# Patient Record
Sex: MALE | Race: WHITE
[De-identification: names, ages, dates, MRNs, and addresses within clinical notes are randomized per-mention and may not be internally consistent; named-entity substitution may affect disease eponyms.]

---

## 2023-01-30 PROBLEM — Z00.00 ENCOUNTER FOR PREVENTIVE HEALTH EXAMINATION: Status: ACTIVE | Noted: 2023-01-30

## 2023-03-01 ENCOUNTER — APPOINTMENT (OUTPATIENT)
Dept: ORTHOPEDIC SURGERY | Facility: CLINIC | Age: 53
End: 2023-03-01
Payer: COMMERCIAL

## 2023-03-01 VITALS — BODY MASS INDEX: 29.12 KG/M2 | HEIGHT: 72 IN | WEIGHT: 215 LBS

## 2023-03-01 DIAGNOSIS — Z78.9 OTHER SPECIFIED HEALTH STATUS: ICD-10-CM

## 2023-03-01 DIAGNOSIS — Z87.891 PERSONAL HISTORY OF NICOTINE DEPENDENCE: ICD-10-CM

## 2023-03-01 PROCEDURE — 99204 OFFICE O/P NEW MOD 45 MIN: CPT

## 2023-03-01 PROCEDURE — 72100 X-RAY EXAM L-S SPINE 2/3 VWS: CPT

## 2023-03-01 NOTE — HISTORY OF PRESENT ILLNESS
[Lower back] : lower back [Gradual] : gradual [8] : 8 [5] : 5 [Dull/Aching] : dull/aching [Sharp] : sharp [Intermittent] : intermittent [Nothing helps with pain getting better] : Nothing helps with pain getting better [Full time] : Work status: full time [de-identified] : Patient presents today with lower back pain for years with NKI. Previously went to a chiropractor. States his pain intermittently radiates down his right leg. Admits to being very stiff in the morning. Admits to taking Aleve PRN for pain. Denies recent imaging. [] : no [FreeTextEntry7] : down the right leg [de-identified] : certain movements [de-identified] :

## 2023-05-03 ENCOUNTER — APPOINTMENT (OUTPATIENT)
Dept: ORTHOPEDIC SURGERY | Facility: CLINIC | Age: 53
End: 2023-05-03
Payer: COMMERCIAL

## 2023-05-03 VITALS — WEIGHT: 215 LBS | HEIGHT: 72 IN | BODY MASS INDEX: 29.12 KG/M2

## 2023-05-03 PROCEDURE — 99214 OFFICE O/P EST MOD 30 MIN: CPT

## 2023-05-03 NOTE — ASSESSMENT
[FreeTextEntry1] : Patient given prescription for MRI, follow up after study is completed to discuss results. \par \par Patient has been doing a home exercise plan based on exercises given on their last office visit.  These exercises include calf stretching, hamstring stretching, hip flexor stretching, hip rotator stretch, quad stretching, curl ups, bridges, prone press up, knee lift leg reach and wall slide.  Patient has been doing these exercises for over 6 weeks, roughly 4 times a week for 30 minutes daily.  Patient is still experiencing low back pain with radiculopathy. \par \par Patient will continue physical therapy. \par \par Recommend: - NSAID - Heating pad - Muscle relaxer - Core strengthening exercise - Hamstring stretching exercise Patient is given back rehabilitation exercise book.

## 2023-05-03 NOTE — HISTORY OF PRESENT ILLNESS
[Lower back] : lower back [Gradual] : gradual [8] : 8 [5] : 5 [Dull/Aching] : dull/aching [Sharp] : sharp [Intermittent] : intermittent [Nothing helps with pain getting better] : Nothing helps with pain getting better [Full time] : Work status: full time [de-identified] : Follow up lumbar spine. States he feels improvement. Admits to going to PT 2x a week with some relief. Denies taking pain medication. [] : no [FreeTextEntry7] : down the right leg [de-identified] : certain movements [de-identified] :

## 2023-05-20 ENCOUNTER — RESULT REVIEW (OUTPATIENT)
Age: 53
End: 2023-05-20

## 2023-06-07 ENCOUNTER — NON-APPOINTMENT (OUTPATIENT)
Age: 53
End: 2023-06-07

## 2023-06-07 ENCOUNTER — APPOINTMENT (OUTPATIENT)
Dept: UROLOGY | Facility: CLINIC | Age: 53
End: 2023-06-07
Payer: COMMERCIAL

## 2023-06-07 VITALS
WEIGHT: 210 LBS | HEIGHT: 72 IN | TEMPERATURE: 97.3 F | HEART RATE: 76 BPM | DIASTOLIC BLOOD PRESSURE: 81 MMHG | BODY MASS INDEX: 28.44 KG/M2 | SYSTOLIC BLOOD PRESSURE: 146 MMHG

## 2023-06-07 DIAGNOSIS — Z86.79 PERSONAL HISTORY OF OTHER DISEASES OF THE CIRCULATORY SYSTEM: ICD-10-CM

## 2023-06-07 PROCEDURE — 99204 OFFICE O/P NEW MOD 45 MIN: CPT

## 2023-06-07 RX ORDER — AMLODIPINE BESYLATE 10 MG/1
10 TABLET ORAL
Refills: 0 | Status: ACTIVE | COMMUNITY

## 2023-06-07 NOTE — ASSESSMENT
[FreeTextEntry1] : Low testosterone:\par c/w 100mg cypionate weekly\par f/u 6 weeks with bloodwork\par \par LUTS:\par start tamsulosin\par side effects of drowsiness and retrograde ejaculation explained

## 2023-06-07 NOTE — HISTORY OF PRESENT ILLNESS
[FreeTextEntry1] : 53yo M referred for low testosterone\par Pt is self-injecting 100mg weekly. \par \par Last blood work showed testosterone 198 total on 5/5/2023\par Testosterone 114 total & 2.8 % free on 5/30/2023\par \par Pt also reports urinary frequency and nocturia.

## 2023-06-07 NOTE — LETTER BODY
[Dear  ___] : Dear ~MARLENY, [Consult Letter:] : I had the pleasure of evaluating your patient, [unfilled]. [Please see my note below.] : Please see my note below. [Consult Closing:] : Thank you very much for allowing me to participate in the care of this patient.  If you have any questions, please do not hesitate to contact me. [Sincerely,] : Sincerely, [FreeTextEntry3] : Aparna Sanderson, \par Genitourinary Medicine\par [facility]\par

## 2023-06-07 NOTE — PHYSICAL EXAM
[General Appearance - Well Developed] : well developed [General Appearance - Well Nourished] : well nourished [Normal Appearance] : normal appearance [Well Groomed] : well groomed [General Appearance - In No Acute Distress] : no acute distress [Edema] : no peripheral edema [Respiration, Rhythm And Depth] : normal respiratory rhythm and effort [] : no respiratory distress [Exaggerated Use Of Accessory Muscles For Inspiration] : no accessory muscle use [Urethral Meatus] : meatus normal [Testes Tenderness] : no tenderness of the testes [Normal Station and Gait] : the gait and station were normal for the patient's age [No Focal Deficits] : no focal deficits [Oriented To Time, Place, And Person] : oriented to person, place, and time [Affect] : the affect was normal [Mood] : the mood was normal [Not Anxious] : not anxious [FreeTextEntry1] : left atrophic testicle

## 2023-06-07 NOTE — REVIEW OF SYSTEMS
[Wake up at night to urinate  How many times?  ___] : wakes up to urinate [unfilled] times during the night [Negative] : Endocrine [see HPI] : see HPI [FreeTextEntry2] : weak stream

## 2023-06-23 ENCOUNTER — NON-APPOINTMENT (OUTPATIENT)
Age: 53
End: 2023-06-23

## 2023-06-27 ENCOUNTER — NON-APPOINTMENT (OUTPATIENT)
Age: 53
End: 2023-06-27

## 2023-07-03 ENCOUNTER — NON-APPOINTMENT (OUTPATIENT)
Age: 53
End: 2023-07-03

## 2023-07-18 ENCOUNTER — APPOINTMENT (OUTPATIENT)
Dept: UROLOGY | Facility: CLINIC | Age: 53
End: 2023-07-18
Payer: COMMERCIAL

## 2023-07-18 VITALS
WEIGHT: 205 LBS | SYSTOLIC BLOOD PRESSURE: 125 MMHG | HEART RATE: 61 BPM | HEIGHT: 72 IN | BODY MASS INDEX: 27.77 KG/M2 | TEMPERATURE: 97.3 F | DIASTOLIC BLOOD PRESSURE: 73 MMHG

## 2023-07-18 PROCEDURE — 99214 OFFICE O/P EST MOD 30 MIN: CPT

## 2023-07-18 NOTE — REVIEW OF SYSTEMS
[see HPI] : see HPI [Wake up at night to urinate  How many times?  ___] : wakes up to urinate [unfilled] times during the night [Negative] : Endocrine [FreeTextEntry2] : weak stream

## 2023-07-18 NOTE — ASSESSMENT
[FreeTextEntry1] : Low testosterone:\par refilled 100mg cypionate weekly\par f/u in 2 months with repeat blood work\par if testosterone and LFTs remain elevated, will lower testosterone\par \par LUTS:\par refilled tamsulosin\par

## 2023-07-18 NOTE — HISTORY OF PRESENT ILLNESS
[FreeTextEntry1] : 53yo M referred for low testosterone\par Pt is self-injecting 100mg weekly. \par \par Recent blood work showed high testosterone, elevated Cr and LFTs. \par He has appointment to see nephrologist in Sept. \par \par He started tamsulosin and reports his frequency and nocturia improved

## 2023-07-20 ENCOUNTER — APPOINTMENT (OUTPATIENT)
Dept: ORTHOPEDIC SURGERY | Facility: CLINIC | Age: 53
End: 2023-07-20

## 2023-09-19 ENCOUNTER — APPOINTMENT (OUTPATIENT)
Dept: UROLOGY | Facility: CLINIC | Age: 53
End: 2023-09-19
Payer: COMMERCIAL

## 2023-09-19 VITALS
DIASTOLIC BLOOD PRESSURE: 84 MMHG | HEART RATE: 69 BPM | TEMPERATURE: 97.3 F | SYSTOLIC BLOOD PRESSURE: 147 MMHG | HEIGHT: 72 IN | WEIGHT: 205 LBS | BODY MASS INDEX: 27.77 KG/M2

## 2023-09-19 PROCEDURE — 99214 OFFICE O/P EST MOD 30 MIN: CPT

## 2023-10-04 ENCOUNTER — APPOINTMENT (OUTPATIENT)
Dept: ORTHOPEDIC SURGERY | Facility: CLINIC | Age: 53
End: 2023-10-04
Payer: COMMERCIAL

## 2023-10-04 DIAGNOSIS — M51.26 OTHER INTERVERTEBRAL DISC DISPLACEMENT, LUMBAR REGION: ICD-10-CM

## 2023-10-04 DIAGNOSIS — M54.16 RADICULOPATHY, LUMBAR REGION: ICD-10-CM

## 2023-10-04 DIAGNOSIS — M48.061 SPINAL STENOSIS, LUMBAR REGION WITHOUT NEUROGENIC CLAUDICATION: ICD-10-CM

## 2023-10-04 DIAGNOSIS — M51.36 OTHER INTERVERTEBRAL DISC DEGENERATION, LUMBAR REGION: ICD-10-CM

## 2023-10-04 DIAGNOSIS — M51.37 OTHER INTERVERTEBRAL DISC DEGENERATION, LUMBOSACRAL REGION: ICD-10-CM

## 2023-10-04 DIAGNOSIS — M47.817 SPONDYLOSIS W/OUT MYELOPATHY OR RADICULOPATHY, LUMBOSACRAL REGION: ICD-10-CM

## 2023-10-04 PROCEDURE — 99214 OFFICE O/P EST MOD 30 MIN: CPT

## 2024-03-06 ENCOUNTER — APPOINTMENT (OUTPATIENT)
Dept: UROLOGY | Facility: CLINIC | Age: 54
End: 2024-03-06
Payer: COMMERCIAL

## 2024-03-06 VITALS
SYSTOLIC BLOOD PRESSURE: 136 MMHG | DIASTOLIC BLOOD PRESSURE: 78 MMHG | BODY MASS INDEX: 27.77 KG/M2 | TEMPERATURE: 96.5 F | HEIGHT: 72 IN | HEART RATE: 67 BPM | WEIGHT: 205 LBS

## 2024-03-06 PROCEDURE — 99214 OFFICE O/P EST MOD 30 MIN: CPT

## 2024-03-06 RX ORDER — TAMSULOSIN HYDROCHLORIDE 0.4 MG/1
0.4 CAPSULE ORAL
Qty: 90 | Refills: 1 | Status: ACTIVE | COMMUNITY
Start: 2023-06-07 | End: 1900-01-01

## 2024-03-06 NOTE — PHYSICAL EXAM
[General Appearance - Well Developed] : well developed [General Appearance - Well Nourished] : well nourished [Normal Appearance] : normal appearance [Well Groomed] : well groomed [General Appearance - In No Acute Distress] : no acute distress [] : no respiratory distress [Edema] : no peripheral edema [Exaggerated Use Of Accessory Muscles For Inspiration] : no accessory muscle use [Respiration, Rhythm And Depth] : normal respiratory rhythm and effort [Oriented To Time, Place, And Person] : oriented to person, place, and time [Affect] : the affect was normal [Not Anxious] : not anxious [Mood] : the mood was normal [No Focal Deficits] : no focal deficits [Normal Station and Gait] : the gait and station were normal for the patient's age

## 2024-03-07 NOTE — REVIEW OF SYSTEMS
[Wake up at night to urinate  How many times?  ___] : wakes up to urinate [unfilled] times during the night [see HPI] : see HPI [Negative] : Endocrine [FreeTextEntry2] : weak stream

## 2024-03-07 NOTE — HISTORY OF PRESENT ILLNESS
[FreeTextEntry1] : 52yo M presents for f/u Pt is self-injecting 200mg every 2 weeks  Recent BW showed low testosterone. He took his bloodwork 1 day prior to his injection.  He takes tamsulosin and reports his frequency and nocturia improved  PSA 0.26 on 8/2023

## 2024-03-07 NOTE — ASSESSMENT
[FreeTextEntry1] : Low testosterone: refilled 200mg cypionate every 2 weeks f/u 3 months with bloodwork  LUTS: refilled tamsulosin

## 2024-06-04 ENCOUNTER — APPOINTMENT (OUTPATIENT)
Dept: UROLOGY | Facility: CLINIC | Age: 54
End: 2024-06-04
Payer: COMMERCIAL

## 2024-06-04 VITALS
WEIGHT: 205 LBS | DIASTOLIC BLOOD PRESSURE: 72 MMHG | SYSTOLIC BLOOD PRESSURE: 152 MMHG | HEIGHT: 72 IN | BODY MASS INDEX: 27.77 KG/M2 | TEMPERATURE: 98 F | HEART RATE: 76 BPM

## 2024-06-04 DIAGNOSIS — R39.9 UNSPECIFIED SYMPTOMS AND SIGNS INVOLVING THE GENITOURINARY SYSTEM: ICD-10-CM

## 2024-06-04 DIAGNOSIS — R79.89 OTHER SPECIFIED ABNORMAL FINDINGS OF BLOOD CHEMISTRY: ICD-10-CM

## 2024-06-04 PROCEDURE — 99214 OFFICE O/P EST MOD 30 MIN: CPT

## 2024-06-04 RX ORDER — TESTOSTERONE CYPIONATE 200 MG/ML
200 INJECTION, SOLUTION INTRAMUSCULAR
Qty: 1 | Refills: 0 | Status: ACTIVE | COMMUNITY
Start: 2023-06-07 | End: 1900-01-01

## 2024-06-04 NOTE — HISTORY OF PRESENT ILLNESS
[FreeTextEntry1] : 54yo M presents for f/u Pt is self-injecting 200mg every 2 weeks  Recent BW showed high testosterone.   He takes tamsulosin and reports his frequency and nocturia improved  PSA 0.26 on 8/2023

## 2024-06-04 NOTE — ASSESSMENT
[FreeTextEntry1] : Low testosterone: will lower testosterone to 150mg every 2 weeks f/u 3 months with bloodwork  LUTS: c/w tamsulosin  show

## 2024-07-30 ENCOUNTER — APPOINTMENT (OUTPATIENT)
Dept: NEUROLOGY | Facility: CLINIC | Age: 54
End: 2024-07-30
Payer: COMMERCIAL

## 2024-07-30 VITALS
DIASTOLIC BLOOD PRESSURE: 80 MMHG | WEIGHT: 205 LBS | OXYGEN SATURATION: 96 % | HEART RATE: 71 BPM | HEIGHT: 72 IN | SYSTOLIC BLOOD PRESSURE: 121 MMHG | BODY MASS INDEX: 27.77 KG/M2

## 2024-07-30 DIAGNOSIS — H81.10 BENIGN PAROXYSMAL VERTIGO, UNSPECIFIED EAR: ICD-10-CM

## 2024-07-30 PROCEDURE — 99204 OFFICE O/P NEW MOD 45 MIN: CPT

## 2024-07-30 RX ORDER — LOSARTAN POTASSIUM 25 MG/1
25 TABLET, FILM COATED ORAL
Refills: 0 | Status: ACTIVE | COMMUNITY

## 2024-07-30 RX ORDER — MECLIZINE HYDROCHLORIDE 25 MG/1
25 TABLET ORAL 3 TIMES DAILY
Qty: 90 | Refills: 3 | Status: ACTIVE | COMMUNITY
Start: 2024-07-30 | End: 1900-01-01

## 2024-07-30 RX ORDER — ATORVASTATIN CALCIUM 20 MG/1
20 TABLET, FILM COATED ORAL
Refills: 0 | Status: ACTIVE | COMMUNITY

## 2024-07-30 NOTE — HISTORY OF PRESENT ILLNESS
[FreeTextEntry1] : Today 7/30/24: Mr. DEON LOPEZ is a 53-year-old man coming in with complaints of Dizziness. PMH of HTN.  Patient reports he has had dizziness for many years, reports that usually with quick position changes or bending forward and usually occurs while he is at work as an .  He states he is usually able to shake it off within a few seconds and has never thought much of it.  However, last week he reports the dizziness onset at home and he was unable to shake it off for over an hour.  Patient went to the ED at [place] and was given Zofran, Ativan and Versed, which relieved the dizziness and nausea.  Patient reports he has not had that significant dizziness since that event.  Patient reports dizziness is mild at this time and intermittent.  He reports the dizziness comes with position changes still.  Denies any vision changes, slurred speech, weakness or numbness.  He reports he has had worsening hearing loss over the last few years but not associated with the dizziness.

## 2024-07-30 NOTE — DATA REVIEWED
[de-identified] : MR Head 7/14/24:  FINDINGS: The MRI examination of the brain demonstrates the ventricles, cisternal spaces, and cortical sulci to be appropriate for the patient's stated age. There is no midline shift or extra-axial collection.  There are scattered nonspecific foci of T2/FLAIR hyperintense signal in the periventricular and subcortical white matter. The diffusion-weighted images demonstrate no evidence of recent ischemic injury. The susceptibility weighted images demonstrate no parenchymal blood products. There are preserved vascular flow-voids.  The visualized paranasal sinuses are free of mucosal disease. The mastoid air cells are well-aerated.  IMPRESSION:  No acute infarction, hemorrhage or mass effect. Few scattered foci of nonspecific white matter signal abnormality which may reflect mild chronic microvascular ischemic changes. [de-identified] : CTP/CTA Head and Neck, 7/14/24: IMPRESSION:   CT PERFUSION: No core infarct or penumbra of ischemic tissue is identified by CT technique.  CTA NECK: 1.  Cervical carotid systems and vertebral arteries are patent bilaterally. 2.  Atherosclerotic plaque causes mild stenosis in the proximal right carotid bulb, measuring 20-30% using NASCET criteria. 3.  Moderate spinal canal stenosis at C3-C4.  If there is clinical concern for acute or chronic myelopathy, follow-up MRI may be obtained for further evaluation. 4.  Incidental 8 mm left thyroid nodule.  In the absence of a family history or risk factors for thyroid cancer, the [employer] College [employer] does not recommend routine follow-up of incidental thyroid nodules measuring less than 1.5 cm in this age group.  CTA HEAD: There are stenoses in multiple M2 branch vessels of the right middle cerebral artery.  Otherwise there is no major vessel occlusion or flow-limiting stenosis about the [place].  No evidence of aneurysm. Tiny aneurysms may be beyond the resolution of CTA technique.

## 2024-07-30 NOTE — ASSESSMENT
[FreeTextEntry1] : Handy Man is a 54YO male, presenting today for episode of vertigo.  Patient had 1 severe episode of vertigo last week that has since resolved.  He reports occasional dizziness like he was having prior to this episode that he is able to take off in a few seconds.  Patient was diagnosed with high blood pressure and is now on blood pressure medications.  All imaging and results reviewed with the patient.  We discussed further treatment options including following up with ENT and vestibular therapy should the patient desire to pursue further evaluation.  Will give the patient as needed meclizine for severe bouts of vertigo.  Neurological exam is unremarkable at this time.  Plan: - Meclizine 25 mg 3 times a day as needed for severe bouts of vertigo. - Referral to ENT for BPPV versus Meniere's disease. - Referral to vestibular therapy for BPPV. - List of new PCPs provided to the patient as his PCP no longer takes his primary insurance, recommended he establish care soon as possible for high blood pressure. - Patient may follow-up as needed.

## 2024-07-30 NOTE — PHYSICAL EXAM
[FreeTextEntry1] : GENERAL PHYSICAL EXAM: GEN: no distress, normal affect EYES: sclera white, conjunctiva clear, no nystagmus PULM: no respiratory distress EXT: no edema, no cyanosis MSK: muscle tone and strength normal SKIN: warm, dry, no rash or lesion on exposed skin   NEUROLOGICAL EXAM: Mental Status Orientation: alert and oriented to person, place, time, and situation Language: clear and fluent, intact comprehension and repetition   Cranial Nerves II: visual fields full to confrontation III, IV, VI: PERRL, EOMI V, VII: facial sensation and movement intact and symmetric VIII: hearing intact IX, X: uvula midline, soft palate elevates normally XI: BL shoulder shrug intact XII: tongue midline   Motor Bilateral muscle strength 5/5 in UE and LE, proximally and distally Tone and bulk are normal in upper and lower limbs   Sensation Intact to light touch in all 4 EXTs  Coordination Normal FTN bilaterally   Gait Normal stance, stride, and pivot turn Tandem walk intact Negative Romberg.

## 2024-09-04 ENCOUNTER — APPOINTMENT (OUTPATIENT)
Dept: UROLOGY | Facility: CLINIC | Age: 54
End: 2024-09-04

## 2024-09-19 ENCOUNTER — RX RENEWAL (OUTPATIENT)
Age: 54
End: 2024-09-19